# Patient Record
Sex: FEMALE | Race: WHITE | URBAN - NONMETROPOLITAN AREA
[De-identification: names, ages, dates, MRNs, and addresses within clinical notes are randomized per-mention and may not be internally consistent; named-entity substitution may affect disease eponyms.]

---

## 2023-10-20 ENCOUNTER — LAB OUTSIDE AN ENCOUNTER (OUTPATIENT)
Dept: URBAN - NONMETROPOLITAN AREA CLINIC 4 | Facility: CLINIC | Age: 53
End: 2023-10-20

## 2023-10-20 ENCOUNTER — OFFICE VISIT (OUTPATIENT)
Dept: URBAN - NONMETROPOLITAN AREA CLINIC 4 | Facility: CLINIC | Age: 53
End: 2023-10-20
Payer: COMMERCIAL

## 2023-10-20 VITALS
HEIGHT: 68 IN | BODY MASS INDEX: 24.43 KG/M2 | DIASTOLIC BLOOD PRESSURE: 94 MMHG | TEMPERATURE: 98.1 F | WEIGHT: 161.2 LBS | SYSTOLIC BLOOD PRESSURE: 129 MMHG | HEART RATE: 87 BPM

## 2023-10-20 DIAGNOSIS — R79.89 ELEVATED LFTS: ICD-10-CM

## 2023-10-20 DIAGNOSIS — K76.0 FATTY LIVER DISEASE, NONALCOHOLIC: ICD-10-CM

## 2023-10-20 PROCEDURE — 99244 OFF/OP CNSLTJ NEW/EST MOD 40: CPT | Performed by: PHYSICIAN ASSISTANT

## 2023-10-20 PROCEDURE — 99204 OFFICE O/P NEW MOD 45 MIN: CPT | Performed by: PHYSICIAN ASSISTANT

## 2023-10-20 RX ORDER — SEMAGLUTIDE 2.4 MG/.75ML
INJECT THE CONTENTS OF ONE PEN UNDER THE SKIN ONCE WEEKLY ON THE SAME DAY EACH WEEK INJECTION, SOLUTION SUBCUTANEOUS
Qty: 3 UNSPECIFIED | Refills: 0 | Status: ACTIVE | COMMUNITY

## 2023-10-20 RX ORDER — SERTRALINE HYDROCHLORIDE 100 MG/1
TABLET ORAL
Qty: 90 TABLET | Status: ACTIVE | COMMUNITY

## 2023-10-20 RX ORDER — ATORVASTATIN CALCIUM 80 MG/1
1 TABLET TABLET, FILM COATED ORAL ONCE A DAY
Status: ACTIVE | COMMUNITY

## 2023-10-20 RX ORDER — ZOLPIDEM TARTRATE 10 MG/1
TAKE 0.5 TABLETS BY MOUTH NIGHTLY AS NEEDED FOR SLEEP TABLET ORAL
Qty: 15 EACH | Refills: 1 | Status: ACTIVE | COMMUNITY

## 2023-10-20 RX ORDER — METFORMIN HYDROCHLORIDE 1000 MG/1
1 TABLET WITH A MEAL TABLET, FILM COATED ORAL ONCE A DAY
Status: ACTIVE | COMMUNITY

## 2023-10-20 RX ORDER — LEVOTHYROXINE SODIUM 88 UG/1
TAKE 1 TABLET DAILY. BRAND NECESSARY TABLET ORAL
Qty: 90 EACH | Refills: 0 | Status: ACTIVE | COMMUNITY

## 2023-10-20 NOTE — HPI-TODAY'S VISIT:
is a 53 year old  female with past medical history of diabetes, as well as fatty liver, hypothyroidism and recent 60 pound weight loss with Wegovy who presented to the office today secondary to incidentally noted elevated liver tests.  Her outpt records reviewed.  In 2022 she has an AST of 30, with ALT of 40.  Her RUQ us was done, showed fatty liver and she was being monitored.  Recent labs from 10.6.23 with AST 90,  alk phos and Tbili normal.  CBC normal with no thrombocytopenia, and no other lab abnormalities  She is alert and oriented, with no jaudice, lethargy and afebrile.  No new medications, no recent abx use, however she has been, over the last 6 months been drinking excessive amounts of herbal tea from a tea store, with additional herbal powders with excessive vitamins.  She was drinking these daily.  Denies EtOH other than a few times a year.   Does mention 20 yrs ago, premature delivery with ?? HELP syndrome, but no long term issues once delivery.

## 2023-10-23 LAB
A/G RATIO: 2.3
ALBUMIN: 4.5
ALKALINE PHOSPHATASE: 108
ALT (SGPT): 219
AST (SGOT): 100
BILIRUBIN, TOTAL: 0.3
BUN/CREATININE RATIO: (no result)
BUN: 13
CALCIUM: 9.8
CARBON DIOXIDE, TOTAL: 30
CHLORIDE: 104
CREATININE: 0.52
EBV NUCLEAR AG (EBNA) AB (IGG): 185
EBV VIRAL CAPSID AG (VCA) AB (IGG): 152
EBV VIRAL CAPSID AG (VCA) AB (IGM): <36
EGFR: 110
GLOBULIN, TOTAL: 2
GLUCOSE: 91
HBSAG SCREEN: (no result)
HEP A AB, IGM: (no result)
HEP B CORE AB, IGM: (no result)
HEPATITIS C ANTIBODY: (no result)
HSV 1 IGG, TYPE SPECIFIC: <0.9
INR: 1
Lab: <0.9
POTASSIUM: 3.8
PROTEIN, TOTAL: 6.5
PT: 10.1
SODIUM: 143

## 2023-11-17 ENCOUNTER — OFFICE VISIT (OUTPATIENT)
Dept: URBAN - NONMETROPOLITAN AREA CLINIC 4 | Facility: CLINIC | Age: 53
End: 2023-11-17

## 2023-11-17 RX ORDER — METFORMIN HYDROCHLORIDE 1000 MG/1
1 TABLET WITH A MEAL TABLET, FILM COATED ORAL ONCE A DAY
Status: ACTIVE | COMMUNITY

## 2023-11-17 RX ORDER — LEVOTHYROXINE SODIUM 88 UG/1
TAKE 1 TABLET DAILY. BRAND NECESSARY TABLET ORAL
Qty: 90 EACH | Refills: 0 | Status: ACTIVE | COMMUNITY

## 2023-11-17 RX ORDER — ATORVASTATIN CALCIUM 80 MG/1
1 TABLET TABLET, FILM COATED ORAL ONCE A DAY
Status: ACTIVE | COMMUNITY

## 2023-11-17 RX ORDER — ZOLPIDEM TARTRATE 10 MG/1
TAKE 0.5 TABLETS BY MOUTH NIGHTLY AS NEEDED FOR SLEEP TABLET ORAL
Qty: 15 EACH | Refills: 1 | Status: ACTIVE | COMMUNITY

## 2023-11-17 RX ORDER — SERTRALINE HYDROCHLORIDE 100 MG/1
TABLET ORAL
Qty: 90 TABLET | Status: ACTIVE | COMMUNITY

## 2023-11-17 RX ORDER — SEMAGLUTIDE 2.4 MG/.75ML
INJECT THE CONTENTS OF ONE PEN UNDER THE SKIN ONCE WEEKLY ON THE SAME DAY EACH WEEK INJECTION, SOLUTION SUBCUTANEOUS
Qty: 3 UNSPECIFIED | Refills: 0 | Status: ACTIVE | COMMUNITY

## 2023-11-30 ENCOUNTER — OFFICE VISIT (OUTPATIENT)
Dept: URBAN - NONMETROPOLITAN AREA CLINIC 4 | Facility: CLINIC | Age: 53
End: 2023-11-30

## 2023-12-08 ENCOUNTER — DASHBOARD ENCOUNTERS (OUTPATIENT)
Age: 53
End: 2023-12-08

## 2023-12-08 ENCOUNTER — OFFICE VISIT (OUTPATIENT)
Dept: URBAN - NONMETROPOLITAN AREA CLINIC 4 | Facility: CLINIC | Age: 53
End: 2023-12-08
Payer: COMMERCIAL

## 2023-12-08 DIAGNOSIS — K76.0 FATTY LIVER DISEASE, NONALCOHOLIC: ICD-10-CM

## 2023-12-08 DIAGNOSIS — R79.89 ELEVATED LFTS: ICD-10-CM

## 2023-12-08 DIAGNOSIS — Z86.010 PERSONAL HISTORY OF COLONIC POLYPS: ICD-10-CM

## 2023-12-08 PROBLEM — 428283002: Status: ACTIVE | Noted: 2023-12-08

## 2023-12-08 PROCEDURE — 99214 OFFICE O/P EST MOD 30 MIN: CPT | Performed by: PHYSICIAN ASSISTANT

## 2023-12-08 RX ORDER — ATORVASTATIN CALCIUM 80 MG/1
1 TABLET TABLET, FILM COATED ORAL ONCE A DAY
COMMUNITY

## 2023-12-08 RX ORDER — SERTRALINE HYDROCHLORIDE 100 MG/1
TABLET ORAL
Qty: 90 TABLET | COMMUNITY

## 2023-12-08 RX ORDER — SEMAGLUTIDE 2.4 MG/.75ML
INJECT THE CONTENTS OF ONE PEN UNDER THE SKIN ONCE WEEKLY ON THE SAME DAY EACH WEEK INJECTION, SOLUTION SUBCUTANEOUS
Qty: 3 UNSPECIFIED | Refills: 0 | COMMUNITY

## 2023-12-08 RX ORDER — ZOLPIDEM TARTRATE 10 MG/1
TAKE 0.5 TABLETS BY MOUTH NIGHTLY AS NEEDED FOR SLEEP TABLET ORAL
Qty: 15 EACH | Refills: 1 | COMMUNITY

## 2023-12-08 RX ORDER — LEVOTHYROXINE SODIUM 88 UG/1
TAKE 1 TABLET DAILY. BRAND NECESSARY TABLET ORAL
Qty: 90 EACH | Refills: 0 | COMMUNITY

## 2023-12-08 RX ORDER — METFORMIN HYDROCHLORIDE 1000 MG/1
1 TABLET WITH A MEAL TABLET, FILM COATED ORAL ONCE A DAY
COMMUNITY

## 2024-07-08 ENCOUNTER — OFFICE VISIT (OUTPATIENT)
Dept: URBAN - NONMETROPOLITAN AREA CLINIC 4 | Facility: CLINIC | Age: 54
End: 2024-07-08
Payer: COMMERCIAL

## 2024-07-08 VITALS
HEART RATE: 76 BPM | BODY MASS INDEX: 21.55 KG/M2 | HEIGHT: 68 IN | WEIGHT: 142.2 LBS | SYSTOLIC BLOOD PRESSURE: 134 MMHG | TEMPERATURE: 98.3 F | DIASTOLIC BLOOD PRESSURE: 70 MMHG

## 2024-07-08 DIAGNOSIS — K76.0 FATTY LIVER DISEASE, NONALCOHOLIC: ICD-10-CM

## 2024-07-08 DIAGNOSIS — Z86.010 PERSONAL HISTORY OF COLONIC POLYPS: ICD-10-CM

## 2024-07-08 DIAGNOSIS — K60.2 ANAL FISSURE: ICD-10-CM

## 2024-07-08 PROCEDURE — 99214 OFFICE O/P EST MOD 30 MIN: CPT | Performed by: PHYSICIAN ASSISTANT

## 2024-07-08 RX ORDER — LEVOTHYROXINE SODIUM 88 UG/1
TAKE 1 TABLET DAILY. BRAND NECESSARY TABLET ORAL
Qty: 90 EACH | Refills: 0 | COMMUNITY

## 2024-07-08 RX ORDER — SERTRALINE HYDROCHLORIDE 100 MG/1
TABLET ORAL
Qty: 90 TABLET | COMMUNITY

## 2024-07-08 RX ORDER — HYDROCORTISONE ACETATE 25 MG/1
1 SUPPOSITORY SUPPOSITORY RECTAL ONCE A DAY
Qty: 14 | Refills: 3 | OUTPATIENT
Start: 2024-07-08 | End: 2024-09-02

## 2024-07-08 RX ORDER — ATORVASTATIN CALCIUM 80 MG/1
1 TABLET TABLET, FILM COATED ORAL ONCE A DAY
COMMUNITY

## 2024-07-08 RX ORDER — ZOLPIDEM TARTRATE 10 MG/1
TAKE 0.5 TABLETS BY MOUTH NIGHTLY AS NEEDED FOR SLEEP TABLET ORAL
Qty: 15 EACH | Refills: 1 | COMMUNITY

## 2024-07-08 RX ORDER — SEMAGLUTIDE 2.4 MG/.75ML
INJECT THE CONTENTS OF ONE PEN UNDER THE SKIN ONCE WEEKLY ON THE SAME DAY EACH WEEK INJECTION, SOLUTION SUBCUTANEOUS
Qty: 3 UNSPECIFIED | Refills: 0 | COMMUNITY

## 2024-07-08 RX ORDER — METFORMIN HYDROCHLORIDE 1000 MG/1
1 TABLET WITH A MEAL TABLET, FILM COATED ORAL ONCE A DAY
COMMUNITY

## 2024-07-08 NOTE — HPI-TODAY'S VISIT:
is a 53 year old  female with past medical history of diabetes, as well as fatty liver, hypothyroidism and recent 60 pound weight loss with Wegovy who presented to the office today secondary to incidentally noted elevated liver tests.  Her outpt records reviewed.  In 2022 she has an AST of 30, with ALT of 40.  Her RUQ us was done, showed fatty liver and she was being monitored.  Recent labs from 10.6.23 with AST 90,  alk phos and Tbili normal.  CBC normal with no thrombocytopenia, and no other lab abnormalities  She is alert and oriented, with no jaudice, lethargy and afebrile.  No new medications, no recent abx use, however she has been, over the last 6 months been drinking excessive amounts of herbal tea from a tea store, with additional herbal powders with excessive vitamins.  She was drinking these daily.  Denies EtOH other than a few times a year.   Does mention 20 yrs ago, premature delivery with ?? HELP syndrome, but no long term issues once delivery.  12.8.23 Doing well, denies any complaimts.  Has now been off all teas with supplements for 6 weeks.  Planned for endocrinologist follow up next week for routine f/u (for DM II and Mounjaro eval)  7.8.24 follow up for LFTs as well as anal pain with BMs and straining  constipated from GLP -1 use, now improved  sitz baths, drinking water, but still with pain with BM  no bleeding.  Repeat LFTs with endo from 3/2024- much imprpved AST to 64 from 135 and ALT to 135 from 210  no longer drinking her herbal teas daily, but still drinking weekly  secondary liver work up normal MRI liver normal

## 2025-02-17 ENCOUNTER — ERX REFILL RESPONSE (OUTPATIENT)
Dept: URBAN - NONMETROPOLITAN AREA CLINIC 4 | Facility: CLINIC | Age: 55
End: 2025-02-17

## 2025-02-17 RX ORDER — HYDROCORTISONE ACETATE 25 MG/1
INSERT 1 SUPPOSITORY RECTALLY ONCE DAILY FOR 14 DAYS SUPPOSITORY RECTAL
Qty: 12 SUPPOSITORY | Refills: 4 | OUTPATIENT

## 2025-02-17 RX ORDER — HYDROCORTISONE ACETATE 25 MG/1
INSERT 1 SUPPOSITORY RECTALLY ONCE DAILY FOR 14 DAYS SUPPOSITORY RECTAL
Qty: 12 SUPPOSITORY | Refills: 5 | OUTPATIENT
